# Patient Record
Sex: MALE | Race: WHITE | HISPANIC OR LATINO | Employment: FULL TIME | URBAN - METROPOLITAN AREA
[De-identification: names, ages, dates, MRNs, and addresses within clinical notes are randomized per-mention and may not be internally consistent; named-entity substitution may affect disease eponyms.]

---
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
## 2022-04-11 ENCOUNTER — APPOINTMENT (OUTPATIENT)
Dept: RADIOLOGY | Facility: MEDICAL CENTER | Age: 22
End: 2022-04-11
Attending: EMERGENCY MEDICINE
Payer: COMMERCIAL

## 2022-04-11 ENCOUNTER — HOSPITAL ENCOUNTER (EMERGENCY)
Facility: MEDICAL CENTER | Age: 22
End: 2022-04-11
Attending: EMERGENCY MEDICINE
Payer: COMMERCIAL

## 2022-04-11 VITALS
OXYGEN SATURATION: 99 % | HEART RATE: 89 BPM | TEMPERATURE: 97 F | HEIGHT: 66 IN | SYSTOLIC BLOOD PRESSURE: 151 MMHG | WEIGHT: 191.8 LBS | RESPIRATION RATE: 16 BRPM | BODY MASS INDEX: 30.82 KG/M2 | DIASTOLIC BLOOD PRESSURE: 92 MMHG

## 2022-04-11 DIAGNOSIS — S69.91XA INJURY OF RIGHT HAND, INITIAL ENCOUNTER: ICD-10-CM

## 2022-04-11 PROCEDURE — 99283 EMERGENCY DEPT VISIT LOW MDM: CPT

## 2022-04-11 PROCEDURE — 73130 X-RAY EXAM OF HAND: CPT | Mod: RT

## 2022-04-11 NOTE — ED TRIAGE NOTES
"Chief Complaint   Patient presents with   • Hand Pain   • Hand Swelling     Pt presents with right hand swelling after punching a wall. Hand is swollen and bruised. Pain is rated at a 7/10 on pain scale.    /94   Pulse 91   Temp 35.9 °C (96.6 °F) (Temporal)   Resp 16   Ht 1.676 m (5' 6\")   Wt 87 kg (191 lb 12.8 oz)   SpO2 98%   BMI 30.96 kg/m²     "

## 2022-04-11 NOTE — ED PROVIDER NOTES
"ED Provider Note    CHIEF COMPLAINT  Chief Complaint   Patient presents with   • Hand Pain   • Hand Swelling        [facility]    Primary care provider: Jeison Lewis M.D.   History obtained from: Patient  History limited by: None     Thom Crane is a 21 y.o. male who presents to the ED complaining of right hand pain and swelling after he became upset and punched a wall shortly prior to arrival.  He denies any other injuries or pain elsewhere.  He denies any sensory change.  He is right-hand dominant.  He denies any significant past medical problems and is not on any medications.    REVIEW OF SYSTEMS  Please see HPI for pertinent positives/negatives.     PAST MEDICAL HISTORY  No past medical history on file.     SURGICAL HISTORY  History reviewed. No pertinent surgical history.     SOCIAL HISTORY  Social History     Tobacco Use   • Smoking status: Current Every Day Smoker   • Smokeless tobacco: Never Used   Substance and Sexual Activity   • Alcohol use: Yes   • Drug use: Never   • Sexual activity: Not on file        FAMILY HISTORY  History reviewed. No pertinent family history.     CURRENT MEDICATIONS  Home Medications     Reviewed by Janet Martins R.N. (Registered Nurse) on 04/11/22 at 0510  Med List Status: <None>   Medication Last Dose Status        Patient Mehdi Taking any Medications                        ALLERGIES  No Known Allergies     PHYSICAL EXAM  VITAL SIGNS: /92   Pulse 89   Temp 36.1 °C (97 °F) (Temporal)   Resp 16   Ht 1.676 m (5' 6\")   Wt 87 kg (191 lb 12.8 oz)   SpO2 99%   BMI 30.96 kg/m²  @PAPITO[[id number]::@     Pulse ox interpretation: 98% I interpret this pulse ox as normal     Constitutional: Well developed, well nourished, alert in no apparent distress, nontoxic appearance   HENT: No external signs of trauma, normocephalic   Eyes: No discharge, no icterus   Neck: No stridor   Cardiovascular: Strong distal pulses and good perfusion   Thorax & Lungs: No respiratory distress "   Extremities: No cyanosis, diffuse swelling of right hand medially with trace ecchymosis between the fourth and fifth metacarpals and tenderness to palpation in that region with limited range of motion of his pinky finger, nontender to palpation other portions of his hand or proximally, sensation intact to touch throughout, intact distal pulses with brisk cap refill   Skin: Warm, dry, no pallor/cyanosis, no rash noted except as above  Neuro: A/O times 3, no focal deficits noted, ambulating without difficulty  Psychiatric: Cooperative, normal mood and affect, normal judgement, appropriate for clinical situation        DIAGNOSTIC STUDIES / PROCEDURES        LABS  All labs reviewed by me.     No results found for this or any previous visit.     RADIOLOGY  The radiologist's interpretation of all radiological studies have been reviewed by me.     DX-HAND 3+ RIGHT   Final Result      1.  There is superficial metacarpal right hand swelling but there is no underlying fracture identified..             COURSE & MEDICAL DECISION MAKING  Nursing notes, VS, PMSFHx reviewed in chart.     Review of past medical records shows the patient was last seen in urgent care on June 1, 2017 and diagnosed with paronychia.      Differential diagnoses considered include but are not limited to: Fx, dislocation, subluxation, contusion, strain, sprain, neurovascular injury       History and physical exam as above.  X-rays of the right hand without evidence for acute bony injury.  I discussed the findings with the patient.  He is noted to be in no acute distress and nontoxic in appearance.  I discussed with patient likely contusion/strain/sprain and supportive home care including ice, elevation and using acetaminophen/ibuprofen as needed.  No evidence for significant neurovascular injury or compartment syndrome.  I discussed with patient worrisome signs and symptoms and return to ED precautions.  Patient also noted to have elevated blood pressure  readings for which he can follow-up on outpatient basis for further management.  Patient verbalized understanding and agreed with plan of care with no further questions or concerns.      The patient is referred to a primary physician for blood pressure management, diabetic screening, and for all other preventative health concerns.       FINAL IMPRESSION  1. Injury of right hand, initial encounter Acute          DISPOSITION  Patient will be discharged home in stable condition.       FOLLOW UP  Jeison Lewis M.D.  [address]  [facility] [address]  [phone]    Call in 1 day      [facility], Emergency Dept  [address] [place] [id number] [phone]    If symptoms worsen         OUTPATIENT MEDICATIONS  There are no discharge medications for this patient.         Electronically signed by: Drew Sheehan D.O., 4/11/2022 5:21 AM      Portions of this record were made with voice recognition software.  Despite my review, spelling/grammar/context errors may still remain.  Interpretation of this chart should be taken in this context.

## 2022-04-14 ENCOUNTER — HOSPITAL ENCOUNTER (EMERGENCY)
Facility: MEDICAL CENTER | Age: 22
End: 2022-04-14
Attending: EMERGENCY MEDICINE
Payer: COMMERCIAL

## 2022-04-14 VITALS
WEIGHT: 193.34 LBS | OXYGEN SATURATION: 97 % | RESPIRATION RATE: 18 BRPM | TEMPERATURE: 97.4 F | SYSTOLIC BLOOD PRESSURE: 137 MMHG | HEART RATE: 67 BPM | HEIGHT: 66 IN | BODY MASS INDEX: 31.07 KG/M2 | DIASTOLIC BLOOD PRESSURE: 96 MMHG

## 2022-04-14 DIAGNOSIS — S60.221D CONTUSION OF RIGHT HAND, SUBSEQUENT ENCOUNTER: ICD-10-CM

## 2022-04-14 PROCEDURE — 99282 EMERGENCY DEPT VISIT SF MDM: CPT

## 2022-04-14 NOTE — ED NOTES
Chief Complaint   Patient presents with   • Medical Clearance     Want to be cleared to work for next Monday     Pt ambulated to triage he was seen here 4/11 after right hand injury and patient requesting medical clearance to go back to work next Monday.

## 2022-04-14 NOTE — Clinical Note
Thom Herrera was seen and treated in our emergency department on 4/14/2022.  He may return to work on 04/18/2022.       If you have any questions or concerns, please don't hesitate to call.      Travis Mckee D.O.

## 2022-04-14 NOTE — ED PROVIDER NOTES
"ED Provider Note    CHIEF COMPLAINT  Chief Complaint   Patient presents with   • Medical Clearance     Want to be cleared to work for next Monday       HPI  Thom Herrera is a 21 y.o. male who presents states he is seen here on the 11th after hitting a wall resulting in significant pain to his right hand.  X-rays are negative that point time.  He states the swelling is come down significantly as he full range of motion of the hand.  He is asking for medical clearance to go back to work.  Is right-hand dominant.  REVIEW OF SYSTEMS  Pertinent positives include edema, ecchymosis to the right hand with decreasing pain and swelling Pertinent negatives include loss of sensation or strength to the right upper extremity/hand.    PAST MEDICAL HISTORY  Right-hand-dominant  FAMILY HISTORY  No family history on file.    SOCIAL HISTORY  Social History     Socioeconomic History   • Marital status: Single   Tobacco Use   • Smoking status: Current Every Day Smoker   • Smokeless tobacco: Never Used   Substance and Sexual Activity   • Alcohol use: Yes   • Drug use: Never       SURGICAL HISTORY  No past surgical history on file.    CURRENT MEDICATIONS  Home Medications    **Home medications have not yet been reviewed for this encounter**         ALLERGIES  No Known Allergies    PHYSICAL EXAM  VITAL SIGNS: /96   Pulse 67   Temp 36.3 °C (97.4 °F) (Temporal)   Resp 18   Ht 1.676 m (5' 6\")   Wt 87.7 kg (193 lb 5.5 oz)   SpO2 97%   BMI 31.21 kg/m²      Constitutional: Well developed, Well nourished, No acute distress, Non-toxic appearance.   Skin: Warm, slight ecchymosis to the right hand and the dorsal aspect of the fourth and fifth metacarpal  Extremities: Slight edema and tenderness to the right hand at the fourth and fifth metacarpal, no step-off deformity slight ecchymosis to the right lateral hand  Neurologic: Strength and sensation intact to right hand    RADIOLOGY/PROCEDURES  I did review the x-ray " completed on 11th and there is no evidence of fracture    COURSE & MEDICAL DECISION MAKING  Pertinent Labs & Imaging studies reviewed. (See chart for details)  This is a charming 3-year-old male with contusion right hand with significant improvement.  The patient has no clinical evidence of fracture.  He would like to go back to work on the 18th and I did write him a note for this.  Strict return precautions have been given.      FINAL IMPRESSION     1. Contusion of right hand, subsequent encounter      DISPOSITION:  Patient will be discharged home in stable condition.    FOLLOW UP:  [facility], Emergency Dept  [address] [address] [id number] [phone]    If symptoms worsen    Seth Vo M.D.  [address]   [address]  [facility] [id number] [phone]    Schedule an appointment as soon as possible for a visit   As needed    Eddie Us M.D.  [address] [facility] [address]  [phone]    Schedule an appointment as soon as possible for a visit in 1 week  As needed    Electronically signed by: Travis Mckee D.O., 4/14/2022 10:20 AM

## 2022-04-14 NOTE — DISCHARGE INSTRUCTIONS
At this point you do not have evidence of a fracture on your x-ray yet you may have an occult fracture that was not seen. For this reason, followup with your primary care physician or orthopedist on call within one week for reevaluation if you continue to have significant pain or followup sooner for increasing symptoms. Place ice on your painful extremity 10 minutes at a time, 10 times a day for pain. Rest, elevate and use compression as needed.      Bone Bruise, Osteochondral Lesions,   Occult Trabecular Microfracture   A bone bruise is a small hidden fracture of the bone. It typically occurs with bones located close to the surface of the skin.   DIAGNOSIS  It can only be seen on special X-rays known as MRI's. This stands for Magnetic Resonance Imaging. A regular X-ray taken of a bone bruise would appear to be normal. A bone bruise is a common injury in the knee and the heel bone (calcaneus). The problems are similar to those produced by stress fractures, which are bone injuries caused by overuse. A bone bruise may also be a sign of other injuries. For example, bone bruises are commonly found where an anterior cruciate ligament (ACL) in the knee has been pulled away from the bone (ruptured). A ligament is a tough fibrous material that connects bones together to make our joints stable. Bruises of the bone last a lot longer than bruises of the muscle or tissues beneath the skin. Bone bruises can last from days to months and are often more severe and painful than other bruises.  SYMPTOMS  The pain lasts longer than a normal bruise.   The bruised area is difficult to use.   There may be discoloration and/or swelling of the bruised area.   When a bone bruise is found with injury to the anterior cruciate ligament (in the knee) there is often an increased:   Amount of fluid in the knee   Time the fluid in the knee lasts.   Number of days until you are walking normally and regaining the motion you had before the injury.    Number of days with pain from the injury.   TREATMENTS  Because bone bruises are sudden injuries you cannot often prevent them, other than by being extremely careful. Some things you can do to improve the condition are:  Apply ice to the sore area for 15 to 20 minutes 4 times per day while awake for the first 2 days. Put the ice in a plastic bag, and place a towel between the bag of ice and your skin.   Keep your bruised area raised (elevated) when possible to lessen swelling.   For Activity:   Use crutches when necessary; do not put weight on the injured leg until you are no longer tender.   You may walk on your affected part as the pain allows, or as instructed.   Start weight bearing gradually on the bruised part.   Continue to use crutches or a cane until you can stand without causing pain, or as instructed.   If a plaster splint was applied, wear the splint until you are seen for a follow-up examination. Rest it on nothing harder than a pillow the first 24 hours. Do not put weight on it. Do not get it wet. You may take it off to take a shower or bath.   If an air splint was applied, more air may be blown into or out of the splint as needed for comfort. You may take it off at night and to take a shower or bath.   Wiggle your toes in the splint several times per day if you are able.   You may have been given an elastic bandage to use with the plaster splint or alone. The splint is too tight if you have numbness, tingling or if your foot becomes cold and blue. Adjust the bandage to make it comfortable.   Only take over-the-counter or prescription medicines for pain, discomfort, or fever as directed by your caregiver.   Follow all instructions for follow-up with your caregiver. This includes any orthopedic referrals, physical therapy, and rehabilitation. Any delay in obtaining necessary care could result in a delay or failure of the bones to heal.   SEEK MEDICAL CARE IF:  You have an increase in bruising,  swelling or pain.   You notice coldness of your toes.   You do not get pain relief with medications.   SEEK IMMEDIATE MEDICAL CARE IF:  Your toes are numb or blue.   You have severe pain not controlled with medications.   If any of the problems that caused you to seek care are becoming worse.   Document Released: 01/06/2009 Document Re-Released: 01/09/2011  [employer]® Patient Information ©2011 [employer].

## 2022-04-14 NOTE — ED NOTES
Discharge orders received from ERP. AVS discharge paperwork with work note provided and explained to patient. All questions answered. Patient AOx4 and ambulatory. Patient discharged to self without incident

## 2022-06-20 ENCOUNTER — OFFICE VISIT (OUTPATIENT)
Dept: URGENT CARE | Facility: CLINIC | Age: 22
End: 2022-06-20
Payer: COMMERCIAL

## 2022-06-20 ENCOUNTER — HOSPITAL ENCOUNTER (OUTPATIENT)
Facility: MEDICAL CENTER | Age: 22
End: 2022-06-20
Attending: PHYSICIAN ASSISTANT
Payer: COMMERCIAL

## 2022-06-20 VITALS
DIASTOLIC BLOOD PRESSURE: 80 MMHG | RESPIRATION RATE: 16 BRPM | HEIGHT: 66 IN | HEART RATE: 66 BPM | SYSTOLIC BLOOD PRESSURE: 120 MMHG | OXYGEN SATURATION: 98 % | TEMPERATURE: 98.2 F | WEIGHT: 190 LBS | BODY MASS INDEX: 30.53 KG/M2

## 2022-06-20 DIAGNOSIS — Z20.2 STD EXPOSURE: ICD-10-CM

## 2022-06-20 DIAGNOSIS — Z11.3 SCREEN FOR STD (SEXUALLY TRANSMITTED DISEASE): ICD-10-CM

## 2022-06-20 LAB
APPEARANCE UR: CLEAR
BILIRUB UR STRIP-MCNC: NEGATIVE MG/DL
COLOR UR AUTO: YELLOW
GLUCOSE UR STRIP.AUTO-MCNC: NEGATIVE MG/DL
KETONES UR STRIP.AUTO-MCNC: NEGATIVE MG/DL
LEUKOCYTE ESTERASE UR QL STRIP.AUTO: NEGATIVE
NITRITE UR QL STRIP.AUTO: NEGATIVE
PH UR STRIP.AUTO: 7 [PH] (ref 5–8)
PROT UR QL STRIP: NEGATIVE MG/DL
RBC UR QL AUTO: NORMAL
SP GR UR STRIP.AUTO: 1.01
UROBILINOGEN UR STRIP-MCNC: 0.2 MG/DL

## 2022-06-20 PROCEDURE — 99203 OFFICE O/P NEW LOW 30 MIN: CPT | Performed by: PHYSICIAN ASSISTANT

## 2022-06-20 PROCEDURE — 87491 CHLMYD TRACH DNA AMP PROBE: CPT

## 2022-06-20 PROCEDURE — 87591 N.GONORRHOEAE DNA AMP PROB: CPT

## 2022-06-20 PROCEDURE — 81002 URINALYSIS NONAUTO W/O SCOPE: CPT | Performed by: PHYSICIAN ASSISTANT

## 2022-06-20 RX ORDER — AZITHROMYCIN 500 MG/1
1000 TABLET, FILM COATED ORAL ONCE
Qty: 2 TABLET | Refills: 0 | Status: SHIPPED | OUTPATIENT
Start: 2022-06-20 | End: 2022-06-20

## 2022-06-20 ASSESSMENT — ENCOUNTER SYMPTOMS
RESPIRATORY NEGATIVE: 1
DIARRHEA: 0
VOMITING: 0
FLANK PAIN: 0
CARDIOVASCULAR NEGATIVE: 1
NAUSEA: 0
CONSTITUTIONAL NEGATIVE: 1
ABDOMINAL PAIN: 0

## 2022-06-21 LAB
C TRACH DNA SPEC QL NAA+PROBE: POSITIVE
N GONORRHOEA DNA SPEC QL NAA+PROBE: NEGATIVE
SPECIMEN SOURCE: ABNORMAL

## 2022-06-21 NOTE — PROGRESS NOTES
"  Subjective:     Thom Herrera  is a 21 y.o. male who presents for Exposure to STD (Possible reexposure to chlamydia, no symptoms)       He presents today for STD screening.  He notes exposure to chlamydia 1-2 weeks ago.  He is having some dysuria at this time.  His sexual was diagnosed with chlamydia.  Notes history of chlamydia that was well treated with azithromycin 1 g.     Review of Systems   Constitutional: Negative.    Respiratory: Negative.    Cardiovascular: Negative.    Gastrointestinal: Negative for abdominal pain, diarrhea, nausea and vomiting.   Genitourinary: Positive for dysuria. Negative for flank pain, frequency, hematuria and urgency.      No Known Allergies  No past medical history on file.     Objective:   /80   Pulse 66   Temp 36.8 °C (98.2 °F) (Temporal)   Resp 16   Ht 1.676 m (5' 6\")   Wt 86.2 kg (190 lb)   SpO2 98%   BMI 30.67 kg/m²   Physical Exam  Vitals and nursing note reviewed.   Constitutional:       General: He is not in acute distress.     Appearance: He is not ill-appearing or toxic-appearing.   HENT:      Head: Normocephalic.      Nose: No rhinorrhea.   Eyes:      General: No scleral icterus.     Conjunctiva/sclera: Conjunctivae normal.   Pulmonary:      Effort: Pulmonary effort is normal. No respiratory distress.      Breath sounds: No stridor.   Musculoskeletal:      Cervical back: Neck supple.   Neurological:      Mental Status: He is alert and oriented to person, place, and time.   Psychiatric:         Mood and Affect: Mood normal.         Behavior: Behavior normal.         Thought Content: Thought content normal.         Judgment: Judgment normal.             Diagnostic testing:    POC urinalysis-trace blood, all others within normal limits    Chlamydia/gonorrhea urine test-pending    Assessment/Plan:     Encounter Diagnoses   Name Primary?   • STD exposure    • Screen for STD (sexually transmitted disease)         Plan for care for today's complaint " includes Azithromycin 1 g.  We will prophylactically treat him as his sexual partner did have a positive diagnosis for chlamydia.  We will run a chlamydia/gonorrhea urine screen, I will contact the patient via phone call with the results.  He should withhold from sexual activity at this time until treatment and test results do come back.  I did offer syphilis, herpes, HIV testing; patient declined all of these at this time.  Prescription for Azithromycin 1 g provided.    See AVS Instructions below for written guidance provided to patient on after-visit management and care in addition to our verbal discussion during the visit.    Please note that this dictation was created using voice recognition software. I have attempted to correct all errors, but there may be sound-alike, spelling, grammar and possibly content errors that I did not discover before finalizing the note.    Chidi Stephens PA-C

## 2022-10-20 ENCOUNTER — HOSPITAL ENCOUNTER (OUTPATIENT)
Facility: MEDICAL CENTER | Age: 22
End: 2022-10-20
Attending: NURSE PRACTITIONER
Payer: COMMERCIAL

## 2022-10-20 ENCOUNTER — OFFICE VISIT (OUTPATIENT)
Dept: URGENT CARE | Facility: CLINIC | Age: 22
End: 2022-10-20
Payer: COMMERCIAL

## 2022-10-20 VITALS
RESPIRATION RATE: 16 BRPM | DIASTOLIC BLOOD PRESSURE: 76 MMHG | TEMPERATURE: 96.7 F | BODY MASS INDEX: 31.45 KG/M2 | HEIGHT: 66 IN | SYSTOLIC BLOOD PRESSURE: 132 MMHG | OXYGEN SATURATION: 97 % | HEART RATE: 66 BPM | WEIGHT: 195.7 LBS

## 2022-10-20 DIAGNOSIS — Z20.2 EXPOSURE TO SEXUALLY TRANSMITTED DISEASE (STD): ICD-10-CM

## 2022-10-20 DIAGNOSIS — L25.9 CONTACT DERMATITIS, UNSPECIFIED CONTACT DERMATITIS TYPE, UNSPECIFIED TRIGGER: ICD-10-CM

## 2022-10-20 DIAGNOSIS — R30.0 DYSURIA: ICD-10-CM

## 2022-10-20 LAB
APPEARANCE UR: CLEAR
BILIRUB UR STRIP-MCNC: NEGATIVE MG/DL
C TRACH DNA SPEC QL NAA+PROBE: POSITIVE
COLOR UR AUTO: YELLOW
GLUCOSE UR STRIP.AUTO-MCNC: NEGATIVE MG/DL
KETONES UR STRIP.AUTO-MCNC: NEGATIVE MG/DL
LEUKOCYTE ESTERASE UR QL STRIP.AUTO: NEGATIVE
N GONORRHOEA DNA SPEC QL NAA+PROBE: NEGATIVE
NITRITE UR QL STRIP.AUTO: NEGATIVE
PH UR STRIP.AUTO: 6 [PH] (ref 5–8)
PROT UR QL STRIP: NEGATIVE MG/DL
RBC UR QL AUTO: NORMAL
SP GR UR STRIP.AUTO: 1.02
SPECIMEN SOURCE: ABNORMAL
UROBILINOGEN UR STRIP-MCNC: 0.2 MG/DL

## 2022-10-20 PROCEDURE — 87591 N.GONORRHOEAE DNA AMP PROB: CPT

## 2022-10-20 PROCEDURE — 81002 URINALYSIS NONAUTO W/O SCOPE: CPT | Performed by: NURSE PRACTITIONER

## 2022-10-20 PROCEDURE — 87491 CHLMYD TRACH DNA AMP PROBE: CPT

## 2022-10-20 PROCEDURE — 99214 OFFICE O/P EST MOD 30 MIN: CPT | Performed by: NURSE PRACTITIONER

## 2022-10-20 RX ORDER — DOXYCYCLINE HYCLATE 100 MG
100 TABLET ORAL 2 TIMES DAILY
Qty: 14 TABLET | Refills: 0 | Status: SHIPPED | OUTPATIENT
Start: 2022-10-20 | End: 2022-10-27

## 2022-10-20 RX ORDER — BETAMETHASONE DIPROPIONATE 0.05 %
1 OINTMENT (GRAM) TOPICAL 2 TIMES DAILY
Qty: 45 G | Refills: 0 | Status: SHIPPED | OUTPATIENT
Start: 2022-10-20 | End: 2022-10-27

## 2022-10-20 NOTE — PROGRESS NOTES
Subjective     Thom Herrera is a 22 y.o. male who presents with Sexually Transmitted Diseases (Pt here for STD treatment ) and Rash (Pt has a rash on legs and arms, itchiness x 1 year )            Sexually Transmitted Diseases  This is a new problem. Episode onset: notes recent onset of dysuria for the last few days, his partner was recently treated for chlamydia. Associated symptoms include a rash. Associated symptoms comments: No penile lesion or penile discharge. He has tried nothing for the symptoms.   Rash  This is a new problem. Episode onset: pt reports new onset of rash that has been coming and going for the last year. it was previously only on his arms, now he says it is spreading to chest, feet, abdomen and legs. The rash is characterized by itchiness and redness. Past treatments include anti-itch cream. The treatment provided no relief.     Review of Systems   Skin:  Positive for rash.          History reviewed. No pertinent past medical history. History reviewed. No pertinent surgical history.   Social History     Socioeconomic History    Marital status: Single     Spouse name: Not on file    Number of children: Not on file    Years of education: Not on file    Highest education level: Not on file   Occupational History    Not on file   Tobacco Use    Smoking status: Former     Types: Cigarettes    Smokeless tobacco: Never   Vaping Use    Vaping Use: Never used   Substance and Sexual Activity    Alcohol use: Yes    Drug use: Never    Sexual activity: Not on file   Other Topics Concern    Behavioral problems Not Asked    Interpersonal relationships Not Asked    Sad or not enjoying activities Not Asked    Suicidal thoughts Not Asked    Poor school performance Not Asked    Reading difficulties Not Asked    Speech difficulties Not Asked    Writing difficulties Not Asked    Inadequate sleep Not Asked    Excessive TV viewing Not Asked    Excessive video game use Not Asked    Inadequate exercise  "Not Asked    Sports related Not Asked    Poor diet Not Asked    Family concerns for drug/alcohol abuse Not Asked    Poor oral hygiene Not Asked    Bike safety Not Asked    Family concerns vehicle safety Not Asked   Social History Narrative    Not on file     Social Determinants of Health     Financial Resource Strain: Not on file   Food Insecurity: Not on file   Transportation Needs: Not on file   Physical Activity: Not on file   Stress: Not on file   Social Connections: Not on file   Intimate Partner Violence: Not on file   Housing Stability: Not on file       Objective     /76 (BP Location: Left arm, Patient Position: Sitting, BP Cuff Size: Adult)   Pulse 66   Temp 35.9 °C (96.7 °F) (Temporal)   Resp 16   Ht 1.676 m (5' 6\")   Wt 88.8 kg (195 lb 11.2 oz)   SpO2 97%   BMI 31.59 kg/m²      Physical Exam  Vitals and nursing note reviewed.   Constitutional:       Appearance: Normal appearance.   HENT:      Head: Normocephalic and atraumatic.      Nose: Nose normal.      Mouth/Throat:      Mouth: Mucous membranes are moist.      Pharynx: Oropharynx is clear.   Eyes:      Extraocular Movements: Extraocular movements intact.      Pupils: Pupils are equal, round, and reactive to light.   Cardiovascular:      Rate and Rhythm: Normal rate and regular rhythm.   Pulmonary:      Effort: Pulmonary effort is normal.   Musculoskeletal:         General: Normal range of motion.      Cervical back: Normal range of motion and neck supple.   Skin:     General: Skin is warm and dry.      Capillary Refill: Capillary refill takes less than 2 seconds.      Findings: Erythema and rash present. Rash is macular and papular.   Neurological:      General: No focal deficit present.      Mental Status: He is alert and oriented to person, place, and time. Mental status is at baseline.   Psychiatric:         Mood and Affect: Mood normal.         Thought Content: Thought content normal.         Judgment: Judgment normal.              "   Lab Results   Component Value Date/Time    POCCOLOR Yellow 10/20/2022 09:05 AM    POCAPPEAR Clear 10/20/2022 09:05 AM    POCLEUKEST Negative 10/20/2022 09:05 AM    POCNITRITE Negative 10/20/2022 09:05 AM    POCUROBILIGE 0.2 10/20/2022 09:05 AM    POCPROTEIN Negative 10/20/2022 09:05 AM    POCURPH 6 10/20/2022 09:05 AM    POCBLOOD Trace-intact 10/20/2022 09:05 AM    POCSPGRV 1.025 10/20/2022 09:05 AM    POCKETONES Negative 10/20/2022 09:05 AM    POCBILIRUBIN Negative 10/20/2022 09:05 AM    POCGLUCUA Negative 10/20/2022 09:05 AM                 Assessment & Plan        1. Dysuria  - POCT Urinalysis    2. Exposure to sexually transmitted disease (STD)  - Chlamydia/GC, PCR (Urine); Future  - doxycycline (VIBRAMYCIN) 100 MG Tab; Take 1 Tablet by mouth 2 times a day for 7 days.  Dispense: 14 Tablet; Refill: 0    3. Contact dermatitis, unspecified contact dermatitis type, unspecified trigger  - betamethasone dipropionate (DIPROLENE) 0.05 % Ointment; Apply 1 Application topically 2 times a day for 7 days.  Dispense: 45 g; Refill: 0       Take full course of abx as directed  Abstain from intercourse for 3 weeks  Will notify patient of results when available  Avoid any possible triggers for rash including new cosmetics, lotions or soaps  Supportive care, differential diagnoses, and indications for immediate follow-up discussed with patient.    Pathogenesis of diagnosis discussed including typical length and natural progression.    Instructed to return to  or nearest emergency department if symptoms fail to improve, for any change in condition, further concerns, or new concerning symptoms.  Patient states understanding of the plan of care and discharge instructions.

## 2023-09-20 ENCOUNTER — OCCUPATIONAL MEDICINE (OUTPATIENT)
Dept: URGENT CARE | Facility: PHYSICIAN GROUP | Age: 23
End: 2023-09-20
Payer: COMMERCIAL

## 2023-09-20 ENCOUNTER — HOSPITAL ENCOUNTER (OUTPATIENT)
Dept: RADIOLOGY | Facility: MEDICAL CENTER | Age: 23
End: 2023-09-20
Attending: NURSE PRACTITIONER

## 2023-09-20 VITALS
OXYGEN SATURATION: 98 % | WEIGHT: 192 LBS | BODY MASS INDEX: 30.86 KG/M2 | TEMPERATURE: 97.6 F | HEART RATE: 86 BPM | RESPIRATION RATE: 16 BRPM | HEIGHT: 66 IN | SYSTOLIC BLOOD PRESSURE: 122 MMHG | DIASTOLIC BLOOD PRESSURE: 80 MMHG

## 2023-09-20 DIAGNOSIS — R06.02 SOB (SHORTNESS OF BREATH): ICD-10-CM

## 2023-09-20 DIAGNOSIS — R09.81 NASAL CONGESTION: ICD-10-CM

## 2023-09-20 DIAGNOSIS — Z77.098 EXPOSURE TO CHEMICAL INHALATION: ICD-10-CM

## 2023-09-20 LAB
FLUAV RNA SPEC QL NAA+PROBE: NEGATIVE
FLUBV RNA SPEC QL NAA+PROBE: NEGATIVE
RSV RNA SPEC QL NAA+PROBE: NEGATIVE
SARS-COV-2 RNA RESP QL NAA+PROBE: NEGATIVE

## 2023-09-20 PROCEDURE — 71046 X-RAY EXAM CHEST 2 VIEWS: CPT

## 2023-09-20 PROCEDURE — 3074F SYST BP LT 130 MM HG: CPT | Performed by: NURSE PRACTITIONER

## 2023-09-20 PROCEDURE — 0241U POCT CEPHEID COV-2, FLU A/B, RSV - PCR: CPT | Performed by: NURSE PRACTITIONER

## 2023-09-20 PROCEDURE — 99213 OFFICE O/P EST LOW 20 MIN: CPT | Performed by: NURSE PRACTITIONER

## 2023-09-20 PROCEDURE — 3079F DIAST BP 80-89 MM HG: CPT | Performed by: NURSE PRACTITIONER

## 2023-09-20 ASSESSMENT — ENCOUNTER SYMPTOMS
CHILLS: 0
SORE THROAT: 1
COUGH: 0
FEVER: 0
WHEEZING: 0
HEADACHES: 1
SHORTNESS OF BREATH: 1

## 2023-09-20 NOTE — LETTER
"   [facility]  [address] [address]  [place] [address]  Phone:  [phone] - Fax:  [phone]   [facility] Progress Report and Disability Certification  Date of Service: 9/20/2023   No Show:  No  Date / Time of Next Visit: 9/27/2023   Claim Information   Patient Name: Thom Crane  Claim Number:     Employer:    Date of Injury: 9/19/2023     Insurer / TPA:    ID / SSN:     Occupation:   Diagnosis: Diagnoses of Exposure to chemical inhalation, SOB (shortness of breath), and Nasal congestion were pertinent to this visit.    Medical Information   Related to Industrial Injury?   Comments:Undetermined, viral respiratory symptoms on exam. Reports acute onset at time of exposure.    Subjective Complaints:  DOI: 9/19/2023. 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 Reports using Rxflush and, \"smoke hit me in the face.\" Had tingling in sinuses with breathing in a burnt smell. Also reports congestion, dizziness, and fatigue. Has felt hot and cold. Took Mucinex and phenylephrine nasal spray. Experienced burning with the spray.  Also states he could have been exposed to Phosgene, the system had been reportedly flushed of the phosgene before hand.        Objective Findings: Physical Exam  Vitals reviewed.   Constitutional:       General: He is not in acute distress.     Appearance: He is not toxic-appearing.   HENT:      Right Ear: Tympanic membrane, ear canal and external ear normal.      Left Ear: Tympanic membrane, ear canal and external ear normal.      Nose: Congestion present.      Mouth/Throat:      Mouth: Mucous membranes are moist.      Pharynx: Posterior oropharyngeal erythema present.   Eyes:      Conjunctiva/sclera: Conjunctivae normal.   Cardiovascular:      Rate and Rhythm: Normal rate.   Pulmonary:      Effort: Pulmonary effort is normal. No tachypnea, bradypnea, accessory " muscle usage, prolonged expiration, respiratory distress or retractions.      Breath sounds: Normal breath sounds. No stridor. No decreased breath sounds, wheezing, rhonchi or rales.   Musculoskeletal:      Cervical back: Neck supple.   Skin:     General: Skin is warm and dry.      Coloration: Skin is not cyanotic or mottled.   Neurological:      Mental Status: He is alert and oriented to person, place, and time.        Pre-Existing Condition(s): None reported.   Assessment:   Initial Visit    Status: Additional Care Required  Permanent Disability:No    Plan:      Diagnostics: X-rayLaboratory    Comments:  Negative COVID testing.     1.  Unremarkable two view chest.    Disability Information   Status: Released to Full Duty    From:  9/20/2023  Through: 9/27/2023 Restrictions are:     Physical Restrictions   Sitting:    Standing:    Stooping:    Bending:      Squatting:    Walking:    Climbing:    Pushing:      Pulling:    Other:    Reaching Above Shoulder (L):   Reaching Above Shoulder (R):       Reaching Below Shoulder (L):    Reaching Below Shoulder (R):      Not to exceed Weight Limits   Carrying(hrs):   Weight Limit(lb):   Lifting(hrs):   Weight  Limit(lb):     Comments: -Symptomatic care.  -Oral hydration and rest.   -Cough control: nonpharmacologic options for cough relief such as throat lozenges, hot tea, honey.  -Over the counter (OTC) expectorant as directed; Guaifenesin (Mucinex).  -Tylenol or ibuprofen for pain and fever as directed.   -Warm salt water gargles.  -OTC Throat lozenges or spray (Cepacol).  -Saline nasal spray  -OTC decongestants, sudafed or antihistamines  -Follow up in 1 week.    Seek emergency medical care immediately for: Trouble breathing, persistent pain or pressure in the chest, confusion, inability to wake or stay awake, bluish lips or face, persistent tachycardia (fast heart rate), prolonged dizziness, persistent high grade fevers.     Chest x-ray ordered with possible chemical  inhalation and SOB. Stable vitals. Stable oxygenation. Clear breath sounds. Has Viral URI symptoms on exam.     Repetitive Actions   Hands: i.e. Fine Manipulations from Grasping:     Feet: i.e. Operating Foot Controls:     Driving / Operate Machinery:     Health Care Provider’s Original or Electronic Signature  HILARIA Laughlin Health Care Provider’s Original or Electronic Signature    Marco Austin DO MPH     Clinic Name / Location: [address] [address] Clinic Phone Number: Dept: [phone]   Appointment Time: 11:35 Am Visit Start Time: 12:11 PM   Check-In Time:  12:05 Pm Visit Discharge Time:  2:05 PM   Original-Treating Physician or Chiropractor    Page 2-Insurer/TPA    Page 3-Employer    Page 4-Employee

## 2023-09-20 NOTE — PROGRESS NOTES
"Subjective     Thom Crane is a 23 y.o. male who presents with Toxic Inhalation (Dizziness, nose stuffiness, burning after inhalation of HVAC replacement at work. 9/19/2023)            DOI: 9/19/2023. 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 Reports using Rxflush, and \"smoke hit me in the face.\" Had tingling in sinuses with breathing in a burnt smell. Also reports congestion, dizziness, and fatigue. Has felt hot and cold. Took Mucinex and phenylephrine nasal spray. Experienced burning with the spray.  Also states he could have been exposed to Phosgene, the system had been reportedly flushed of the phosgene before hand.         Review of Systems   Constitutional:  Positive for malaise/fatigue. Negative for chills and fever.   HENT:  Positive for congestion and sore throat.    Respiratory:  Positive for shortness of breath. Negative for cough and wheezing.    Neurological:  Positive for headaches.   All other systems reviewed and are negative.             Objective     /80   Pulse 86   Temp 36.4 °C (97.6 °F) (Temporal)   Resp 16   Ht 1.676 m (5' 6\")   Wt 87.1 kg (192 lb)   SpO2 98%   BMI 30.99 kg/m²      Physical Exam  Vitals reviewed.   Constitutional:       General: He is not in acute distress.     Appearance: He is not toxic-appearing.   HENT:      Right Ear: Tympanic membrane, ear canal and external ear normal.      Left Ear: Tympanic membrane, ear canal and external ear normal.      Nose: Congestion present.      Mouth/Throat:      Mouth: Mucous membranes are moist.      Pharynx: Posterior oropharyngeal erythema present.   Eyes:      Conjunctiva/sclera: Conjunctivae normal.   Cardiovascular:      Rate and Rhythm: Normal rate.   Pulmonary:      Effort: Pulmonary effort is normal. No tachypnea, bradypnea, accessory muscle usage, prolonged expiration, respiratory distress or retractions.      Breath sounds: " Normal breath sounds. No stridor. No decreased breath sounds, wheezing, rhonchi or rales.   Musculoskeletal:      Cervical back: Neck supple.   Skin:     General: Skin is warm and dry.      Coloration: Skin is not cyanotic or mottled.   Neurological:      Mental Status: He is alert and oriented to person, place, and time.                             Assessment & Plan   1. Exposure to chemical inhalation  - DX-CHEST-2 VIEWS; Future    2. SOB (shortness of breath)  - DX-CHEST-2 VIEWS; Future    3. Nasal congestion  - POCT CEPHEID COV-2, FLU A/B, RSV - PCR  Results for orders placed or performed in visit on 09/20/23   POCT CEPHEID COV-2, FLU A/B, RSV - PCR   Result Value Ref Range    SARS-CoV-2 by PCR Negative Negative, Invalid    Influenza virus A RNA Negative Negative, Invalid    Influenza virus B, PCR Negative Negative, Invalid    RSV, PCR Negative Negative, Invalid     DX-CHEST-2 VIEWS    Result Date: 9/20/2023 9/20/2023 12:40 PM HISTORY/REASON FOR EXAM:  Shortness of breath and chest congestion for 2 days. Recent exposure to phosgene yesterday. TECHNIQUE/EXAM DESCRIPTION AND NUMBER OF VIEWS: Two views of the chest. COMPARISON:  None available. FINDINGS: The mediastinal and cardiac silhouette is unremarkable. The pulmonary vascularity is within normal limits. The lung parenchyma is clear. There is no significant pleural effusion. There is no visible pneumothorax. There are no acute bony abnormalities.     1.  Unremarkable two view chest.    -Symptomatic care.  -Oral hydration and rest.   -Cough control: nonpharmacologic options for cough relief such as throat lozenges, hot tea, honey.  -Over the counter (OTC) expectorant as directed; Guaifenesin (Mucinex).  -Tylenol or ibuprofen for pain and fever as directed.   -Warm salt water gargles.  -OTC Throat lozenges or spray (Cepacol).  -Saline nasal spray  -OTC decongestants, sudafed or antihistamines  -Follow up in 1 week.    Seek emergency medical care immediately  "for: Trouble breathing, persistent pain or pressure in the chest, confusion, inability to wake or stay awake, bluish lips or face, persistent tachycardia (fast heart rate), prolonged dizziness, persistent high grade fevers.     -Chest x-ray ordered with possible chemical inhalation and SOB. Stable vitals. Stable oxygenation. Clear breath sounds. Reviewed MSDS for both Rx11-flush and Phosgene. Inhalation tx for both include removing the patient from the environment and placing in fresh air. No abnormal findings, including pneumonitis on imaging. Has Viral URI symptoms on exam.    Rx11-flush, Nu-calgon:  \"IF INHALED: Remove person to fresh air and keep comfortable for breathing. Harmful if inhaled. May cause drowsiness and dizziness. Headache. Nausea, vomiting. Narcotic effects\".    Phosgene:  \"Call a poison center or physician. Remove victim to fresh air and keep at rest in a position comfortable for breathing. If it is suspected that fumes are still present, the rescuer should wear an appropriate mask or self-contained breathing apparatus\".    "

## 2023-09-20 NOTE — PATIENT INSTRUCTIONS
-Symptomatic care.  -Oral hydration and rest.   -Cough control: nonpharmacologic options for cough relief such as throat lozenges, hot tea, honey.  -Over the counter (OTC) expectorant as directed; Guaifenesin (Mucinex).  -Tylenol or ibuprofen for pain and fever as directed.   -Warm salt water gargles.  -OTC Throat lozenges or spray (Cepacol).  -Saline nasal spray  -OTC decongestants, sudafed or antihistamines  -Follow up in 1 week.    Seek emergency medical care immediately for: Trouble breathing, persistent pain or pressure in the chest, confusion, inability to wake or stay awake, bluish lips or face, persistent tachycardia (fast heart rate), prolonged dizziness, persistent high grade fevers.

## 2023-09-20 NOTE — LETTER
"EMPLOYEE’S CLAIM FOR COMPENSATION/ REPORT OF INITIAL TREATMENT  FORM C-4  PLEASE TYPE OR PRINT    EMPLOYEE’S CLAIM - PROVIDE ALL INFORMATION REQUESTED   First Name  Thom Last Name  Royce Birthdate                    2000                Sex  male Claim Number (Insurer’s Use Only)   Home Address  [address] Age  23 y.o. 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 (if applicable)  purging system for compressor replacement    How did this injury or occupational disease occur? (Be specific and answer in detail. Use additional sheet if necessary)  I was inside collecting rxflush to clean system, while inside package unit a blast of phesgeme was released and I took 2 full inhalations before getting out and immidatly stated with symptoms   If you believe that you have an occupational disease, when did you first have knowledge of the disability and its relationship to your employment?  N/A Witnesses to the Accident (if applicable)  Jose Alfredo Crane      Nature of Injury or Occupational Disease  Workers' Compensation  Part(s) of Body Injured or Affected  Lungs, N/A, N/A    I CERTIFY THAT THE " ABOVE IS TRUE AND CORRECT TO T HE BEST OF MY KNOWLEDGE AND THAT I HAVE PROVIDED THIS INFORMATION IN ORDER TO OBTAIN THE BENEFITS OF [place]  I HEREBY AUTHORIZE ANY PHYSICIAN, CHIROPRACTOR, SURGEON, PRACTITIONER OR ANY OTHER PERSON, ANY HOSPITAL, INCLUDING [facility] OR [facility], ANY  MEDICAL SERVICE ORGANIZATION, ANY INSURANCE COMPANY, OR OTHER INSTITUTION OR ORGANIZATION TO RELEASE TO EACH OTHER, ANY MEDICAL OR OTHER INFORMATION, INCLUDING BENEFITS PAID OR PAYABLE, PERTINENT TO THIS INJURY OR DISEASE, EXCEPT INFORMATION RELATIVE TO DIAGNOSIS, TREATMENT AND/OR COUNSELING FOR AIDS, PSYCHOLOGICAL CONDITIONS, ALCOHOL OR CONTROLLED SUBSTANCES, FOR WHICH I MUST GIVE SPECIFIC AUTHORIZATION.  A PHOTOSTAT OF THIS AUTHORIZATION SHALL BE VALID AS THE ORIGINAL.       Date   Place Employee’s Original or  *Electronic Signature   THIS REPORT MUST BE COMPLETED AND MAILED WITHIN 3 WORKING DAYS OF TREATMENT   Place  [facility]  Name of Facility  [facility]   Date  9/20/2023 Diagnosis and Description of Injury or Occupational Disease  (Z77.098) Exposure to chemical inhalation  (R06.02) SOB (shortness of breath)  (R09.81) Nasal congestion Is there evidence that the injured employee was under the influence of alcohol and/or another controlled substance at the time of accident?  ? No ? Yes (if yes, please explain)   Hour  12:11 PM   Diagnoses of Exposure to chemical inhalation, SOB (shortness of breath), and Nasal congestion were pertinent to this visit. No   Treatment  - DX-CHEST-2 VIEWS  - POCT CEPHEID COV-2, FLU A/B, RSV - PCR    Chest x-ray ordered with possible chemical inhalation and SOB. Stable vitals. Stable oxygenation. Clear breath sounds. Has Viral URI symptoms on exam.     -Symptomatic care.  -Oral hydration and rest.   -Cough control: nonpharmacologic options for cough relief such as  throat lozenges, hot tea, honey.  -Over the counter (OTC) expectorant as directed; Guaifenesin (Mucinex).  -Tylenol or ibuprofen for pain and fever as directed.   -Warm salt water gargles.  -OTC Throat lozenges or spray (Cepacol).  -Saline nasal spray  -OTC decongestants, sudafed or antihistamines  -Follow up in 1 week.  Have you advised the patient to remain off work five days or     more?    X-Ray Findings  Negative   ? Yes Indicate dates:   From   To      From information given by the employee, together with medical evidence, can        you directly connect this injury or occupational disease as job incurred?    Comments:Undetermined, Viral respiratory symptoms on exam. ? No If no, is the injured employee capable of:  ? full duty  Yes ? modified duty      Is additional medical care by a physician indicated?  Yes If modified duty, specify any limitations / restrictions      Do you know of any previous injury or disease contributing to this condition or occupational disease?  ? Yes ? No (Explain if yes)                          No   Date  9/20/2023 Print Health Care Provider's  Name  HILARIA Laughlin I certify that the employer’s copy of  this form was delivered to the employer on:   Address  [address] [place] [address]. Insurer’s Use Only     [facility] Zip  [address]    Provider’s Tax ID Number  [id number] Telephone  Dept: [phone]             Health Care Provider’s Original or Electronic Signature  e-AYAD Knowles.RGUALBERTO Degree (MD,DO, DC,PA-C,APRN)  APRN      * Complete and attach Release of Information (Form C-4A) when injured employee signs C-4 Form electronically  ORIGINAL - TREATING HEALTHCARE PROVIDER PAGE 2 - INSURER/TPA PAGE 3 - EMPLOYER PAGE 4 - EMPLOYEE             Form C-4 (rev.08/21)           BRIEF DESCRIPTION OF RIGHTS AND BENEFITS  ([place]ursuant to [place] 616[place])    Notice of Injury or Occupational Disease (Incident Report Form C-1): If an injury or occupational disease (OD) arises out of  "and in the course of employment, you must provide written notice to your employer as soon as practicable, but no later than 7 days after the accident or OD. Your employer shall maintain a sufficient supply of the required forms.    Claim for Compensation (Form C-4): If medical treatment is sought, the form C-4 is available at the place of initial treatment. A completed \"Claim for Compensation\" (Form C-4) must be filed within 90 days after an accident or OD. The treating physician or chiropractor must, within 3 working days after treatment, complete and mail to the employer, the employer's insurer and third-party , the Claim for Compensation.    Medical Treatment: If you require medical treatment for your on-the-job injury or OD, you may be required to select a physician or chiropractor from a list provided by your workers’ compensation insurer, if it has contracted with an Organization for Managed Care (MCO) or Preferred Provider Organization (PPO) or providers of health care. If your employer has not entered into a contract with an MCO or PPO, you may select a physician or chiropractor from the Panel of Physicians and Chiropractors. Any medical costs related to your industrial injury or OD will be paid by your insurer.    Temporary Total Disability (TTD): If your doctor has certified that you are unable to work for a period of at least 5 consecutive days, or 5 cumulative days in a 20-day period, or places restrictions on you that your employer does not accommodate, you may be entitled to TTD compensation.    Temporary Partial Disability (TPD): If the wage you receive upon reemployment is less than the compensation for TTD to which you are entitled, the insurer may be required to pay you TPD compensation to make up the difference. TPD can only be paid for a maximum of 24 months.    Permanent Partial Disability (PPD): When your medical condition is stable and there is an indication of a PPD as a result " of your injury or OD, within 30 days, your insurer must arrange for an evaluation by a rating physician or chiropractor to determine the degree of your PPD. The amount of your PPD award depends on the date of injury, the results of the PPD evaluation, your age and wage.    Permanent Total Disability (PTD): If you are medically certified by a treating physician or chiropractor as permanently and totally disabled and have been granted a PTD status by your insurer, you are entitled to receive monthly benefits not to exceed 66 2/3% of your average monthly wage. The amount of your PTD payments is subject to reduction if you previously received a lump-sum PPD award.    Vocational Rehabilitation Services: You may be eligible for vocational rehabilitation services if you are unable to return to the job due to a permanent physical impairment or permanent restrictions as a result of your injury or occupational disease.    Transportation and Per Stacia Reimbursement: You may be eligible for travel expenses and per stacia associated with medical treatment.    Reopening: You may be able to reopen your claim if your condition worsens after claim closure.     Appeal Process: If you disagree with a written determination issued by the insurer or the insurer does not respond to your request, you may appeal to the Department of Administration, , by following the instructions contained in your determination letter. You must appeal the determination within 70 days from the date of the determination letter at [address], [address], [place], [place] [address], or [address], [place] [address]. If you disagree with the  decision, you may appeal to the Department of Administration, . You must file your appeal within 30 days from the date of the  decision letter at [address], [address], [place], [place] [address], or [address]  [facility], [address], [place], [place] [address]. If you disagree with a decision of an , you may file a petition for judicial review with the District Court. You must do so within 30 days of the Appeal Officer’s decision. You may be represented by an  at your own expense or you may contact the [facility] for possible representation.    [place]  for Injured Workers (NAIW): If you disagree with a  decision, you may request that NAIW represent you without charge at an  Hearing. For information regarding denial of benefits, you may contact the [facility] at: [address] [address] [facility], [address], [place] [address], [phone], or [address], [place] [address], [phone]    To File a Complaint with the Division: If you wish to file a complaint with the  of the Division of Industrial Relations (DIR),  please contact the Workers’ Compensation Section, [address], [address], [place], [place] [address], telephone ([phone]) [phone], or [address], [address], [place], [place] [address], telephone [phone].    For assistance with Workers’ Compensation Issues: You may contact the [facility] Office for Consumer Health Assistance, [address], [address], [place], [place] [address], Toll Free 1-824.272.6641, Web site: http://[facility].[place].gov/Programs/[place] E-mail: [place]@[facility].[place].gov              __________________________________________________________________                                    _________________            Employee Name / Signature                                                                                                                            Date                                                                                                                                                                                                                              D-2 (rev. 10/20)